# Patient Record
Sex: FEMALE | Race: WHITE | NOT HISPANIC OR LATINO | Employment: OTHER | URBAN - METROPOLITAN AREA
[De-identification: names, ages, dates, MRNs, and addresses within clinical notes are randomized per-mention and may not be internally consistent; named-entity substitution may affect disease eponyms.]

---

## 2023-04-30 ENCOUNTER — HOSPITAL ENCOUNTER (EMERGENCY)
Facility: CLINIC | Age: 73
Discharge: HOME | End: 2023-04-30
Attending: EMERGENCY MEDICINE | Admitting: EMERGENCY MEDICINE
Payer: COMMERCIAL

## 2023-04-30 VITALS
SYSTOLIC BLOOD PRESSURE: 166 MMHG | HEIGHT: 66 IN | OXYGEN SATURATION: 99 % | HEART RATE: 94 BPM | RESPIRATION RATE: 18 BRPM | WEIGHT: 179.45 LBS | BODY MASS INDEX: 28.84 KG/M2 | TEMPERATURE: 98 F | DIASTOLIC BLOOD PRESSURE: 99 MMHG

## 2023-04-30 DIAGNOSIS — G51.0 BELL'S PALSY: ICD-10-CM

## 2023-04-30 DIAGNOSIS — I10 HYPERTENSION, UNSPECIFIED TYPE: ICD-10-CM

## 2023-04-30 DIAGNOSIS — H66.90 ACUTE OTITIS MEDIA, UNSPECIFIED OTITIS MEDIA TYPE: ICD-10-CM

## 2023-04-30 LAB — GLUCOSE BLDC GLUCOMTR-MCNC: 120 MG/DL (ref 70–99)

## 2023-04-30 PROCEDURE — 82962 GLUCOSE BLOOD TEST: CPT

## 2023-04-30 PROCEDURE — 99284 EMERGENCY DEPT VISIT MOD MDM: CPT

## 2023-04-30 RX ORDER — AZITHROMYCIN 250 MG/1
TABLET, FILM COATED ORAL
Qty: 6 TABLET | Refills: 0 | Status: SHIPPED | OUTPATIENT
Start: 2023-04-30 | End: 2023-05-05

## 2023-04-30 RX ORDER — VALACYCLOVIR HYDROCHLORIDE 1 G/1
1000 TABLET, FILM COATED ORAL 2 TIMES DAILY
Qty: 20 TABLET | Refills: 0 | Status: SHIPPED | OUTPATIENT
Start: 2023-04-30 | End: 2023-05-10

## 2023-04-30 RX ORDER — PREDNISONE 20 MG/1
TABLET ORAL
Qty: 10 TABLET | Refills: 0 | Status: SHIPPED | OUTPATIENT
Start: 2023-04-30

## 2023-05-01 NOTE — ED TRIAGE NOTES
Pt presents for evaluation of right sided facial droop and numbness that started mid-afternoon. This am, woke up with right eye watery. Denies vision changes or headache. Denies any other symptoms.

## 2023-05-01 NOTE — ED PROVIDER NOTES
"  History     Chief Complaint:  Facial Droop       The history is provided by the patient.      Maria Elena Nance is a 72 year old female with a history of hypothyroid and breast cancer who presents with a right side facial droop. The patient reports that she was golfing on 04/27 when she noticed eye watering and irritation. She notes that this persisted, and when she woke this morning, she noticed the same thing. However, later in the day, Maria Elena reports that she had difficulty moving her mouth at roughly 1:00 PM. She then tried to drink a pop later in the day and she was drooling. She tried to take a nap, and when she awoke, she noticed that she could not smile, so she did an Internet search and decided she wanted to come into the ED. Additionally, Maria Elena mentions that her tongue feels thin and similar to the sensation of Novocain. She also notes a twinge pain in her right ear, that has since resolved. The patient states that she was not having any of these problems yesterday. She reports drinking 04/27 after golfing. No recent sore throat, fever, cough, runny nose, ear pain, arm symptoms, or leg symptoms. Denies vision changes or headache. Maria Elena states that she does sleep on her right side. The patient is on levothyroxine and is a breast cancer survivor.    Independent Historian:   None - Patient Only        ROS:  Review of Systems  Negative besides those mentioned in the HPI.    Allergies:  Penicillins     Medications:    Levothyroxine     Past Medical History:    Hypothyroid  Breast cancer    Past Surgical History:    Thyroid surgery    Social History:  The patient presents to the ED alone.  The patient presents to the ED via car.  PCP: No primary care provider on file.     Physical Exam     Patient Vitals for the past 24 hrs:   BP Temp Temp src Pulse Resp SpO2 Height Weight   04/30/23 1930 (!) 166/99 -- -- 94 -- 99 % -- --   04/30/23 1915 (!) 182/114 98  F (36.7  C) Oral 95 18 100 % 1.676 m (5' 6\") 81.4 kg " (179 lb 7.3 oz)        Physical Exam  General: The patient is alert, in no respiratory distress.    HENT: Mucous membranes moist. Left TM has erythema and is bulging.    Cardiovascular: Regular rate and rhythm. Good pulses in all four extremities. Normal capillary refill and skin turgor.     Respiratory: Lungs are clear. No nasal flaring. No retractions. No wheezing, no crackles.    Gastrointestinal: Abdomen soft. No guarding, no rebound. No palpable hernias.     Musculoskeletal: No gross deformity.     Skin: No rashes or petechiae.     Neurologic: The patient is alert and oriented x3. GCS 15. No testable cranial nerve deficit. Follows commands with clear and appropriate speech. Gives appropriate answers. Good strength in all extremities. No gross neurologic deficit. Gross sensation intact. Pupils are round and reactive. No meningismus. Dense facial paresis on the left.    Lymphatic: No cervical adenopathy. No lower extremity swelling.    Psychiatric: The patient is non-tearful.    Emergency Department Course     Laboratory:  Labs Ordered and Resulted from Time of ED Arrival to Time of ED Departure   GLUCOSE BY METER - Abnormal       Result Value    GLUCOSE BY METER POCT 120 (*)    GLUCOSE MONITOR NURSING POCT        Emergency Department Course & Assessments:       Assessments:  1918 I obtained history and examined the patient as noted above.    Social Determinants of Health affecting care:   None    Disposition:  The patient was discharged to home.     Impression & Plan      Medical Decision Making:  The patient had fairly abrupt onset of symptoms but her only affected area is the right side of her face and it involves the entire face with significant paresis over the forehead which should be spared if there is a stroke.  She did report some pain in her ear prior to this says she does sleep on that side and had drank some alcohol I think a lot of these factors are caused problems to the facial nerve she also has  signs of an otitis media I therefore will start her on antibiotics valacyclovir and steroids.  We discussed the low likelihood of this being a stroke I think this is just the facial nerve showing paresis I stressed if she would have any new or worsening symptoms she would need to return and we discussed symptomatic treatment and the patient was discharged home in good condition.  Of note we did discuss her blood pressures LV which I think is likely secondary to stress and will need to be rechecked as an outpatient.    Diagnosis:    ICD-10-CM    1. Bell's palsy  G51.0       2. Hypertension, unspecified type  I10       3. Acute otitis media, unspecified otitis media type  H66.90            Discharge Medications:  New Prescriptions    AZITHROMYCIN (ZITHROMAX Z-JAVIER) 250 MG TABLET    Two tablets on the first day, then one tablet daily for the next 4 days    PREDNISONE (DELTASONE) 20 MG TABLET    Take two tablets (= 40mg) each day for 5 (five) days    VALACYCLOVIR (VALTREX) 1000 MG TABLET    Take 1 tablet (1,000 mg) by mouth 2 times daily for 10 days        Scribe Disclosure:  I, Kvng Blount, am serving as a scribe at 7:34 PM on 4/30/2023 to document services personally performed by El Lugo MD, based on my observations and the provider's statements to me.   4/30/2023   El Lugo MD Farnan, Christopher M, MD  04/30/23 2508

## 2023-05-01 NOTE — ED NOTES
Bed: JAVID  Expected date: 4/30/23  Expected time: 6:48 PM  Means of arrival:   Comments:  Lexie CHRISTIE

## 2023-05-01 NOTE — DISCHARGE INSTRUCTIONS
"  Discharge Instructions  Otitis Media  You or your child have an ear infection known as acute otitis media, or middle ear infection (otitis = ear, media = middle). These infections often develop after a virus infection, such as a cold. The cold causes swelling around the pressure-equalizing tube of the ear, which allows fluid to build up in the space behind the eardrum (the middle ear). This fluid build-up can trap bacteria and viruses and increase pressure on the eardrum causing pain.  Return to the Emergency Department if:  You or your child are not better within 24-48 hours.  Your child becomes very fussy or weak.  Your child is showing signs of dehydration, such as less than 3 wet diapers per day.  Your symptoms get worse, or if you develop a severe headache, stiff neck, or new symptoms.  You have signs of allergic reaction to medicine. These include rash, lip swelling, difficulty breathing, wheezing, and dizziness.    Ear infection symptoms:   Symptoms of an ear infection can include ear aching or pain and temporary hearing loss. These symptoms often come on suddenly.    Ear infection symptoms (infants / young children):  Fever (temperature greater than 100.4 F or 38 C).  Pulling on the ear.  Fussiness.  Decreased activity.  Lack of appetite or difficulty eating.  Vomiting or diarrhea.    Treatment:   The \"best\" treatment depends on your age, history of previous infections, and any underlying medical problems.  Antibiotics are not given to every patient with an ear infection because studies show that many people with ear infections will improve without using antibiotics. Because antibiotics can have side effects such as diarrhea and stomach upset and can also cause severe allergic reactions, doctors are trying to avoid using antibiotics if it is safe for the patient to do so.   In these cases, a prescription for antibiotics may be given to be filled in 24 -48 hours if symptoms are getting worse or not " "improving. If the symptoms are improving, the antibiotic does not need to be taken.   Remember, antibiotics do not treat pain.    Pain medications. You may take a pain medication such as Tylenol  (acetaminophen), Advil  (ibuprofen), Nuprin  (ibuprofen) or Aleve  (naproxen).  If you have been given a narcotic such as Vicodin  (hydrocodone with acetaminophen), Percocet  (oxycodone with acetaminophen), or codeine, do not drive for four hours after you have taken it. If the narcotic contains Tylenol  (acetaminophen), do not take Tylenol  with it. All narcotics will cause constipation, so eat a high fiber diet.    Pain treatment options also include ear drops such as Auralgan  (antipyrine/benzocaine/u-polycosanol), which contains a topical numbing medicine.   Do not take a medication if you have a known allergy to that medication.  Probiotics: If you have been given an antibiotic, you may want to also take a probiotic pill or eat yogurt with live cultures. Probiotics have \"good bacteria\" to help your intestines stay healthy. Studies have shown that probiotics help prevent diarrhea and other intestine problems (including C. diff infection) when you take antibiotics. You can buy these without a prescription in the pharmacy section of the store.   Complications:    Tympanic membrane rupture - One possible complication of an ear infection is rupture of the tympanic membrane, or ear drum. This happens because of pressure on the tympanic membrane from the infected fluid. When the tympanic membrane ruptures, you may have pus or blood drain from the ear. It does not hurt when the membrane ruptures, and many people actually feel better because pressure is released. Fortunately, the tympanic membrane usually heals quickly after rupturing, within hours to days. You should keep water out of the ear until you re-check with your doctor to be sure the ear drum has healed.     Mastoiditis - Rarely, the area behind the ear can become " infected, this area is called the mastoid.  If you notice redness and swelling behind your ear, see your physician or return to the Emergency Department immediately.      Hearing loss - The fluid that collects behind the eardrum (called an effusion) can persist for weeks to months after the pain of an ear infection resolves. An effusion causes trouble hearing, which is usually temporary. If the fluid persists, however, it can interfere with the process of learning to speak.   For this reason, children under 2 need to be seen by their pediatrician WITHIN 3 MONTHS to ensure that the fluid has been reabsorbed.  If you were given a prescription for medicine here today, be sure to read all of the information (including the package insert) that comes with your prescription.  This will include important information about the medicine, its side effects, and any warnings that you need to know about.  The pharmacist who fills the prescription can provide more information and answer questions you may have about the medicine.  If you have questions or concerns that the pharmacist cannot address, please call or return to the Emergency Department.   Opioid Medication Information    Pain medications are among the most commonly prescribed medicines, so we are including this information for all our patients. If you did not receive pain medication or get a prescription for pain medicine, you can ignore it.     You may have been given a prescription for an opioid (narcotic) pain medicine and/or have received a pain medicine while here in the Emergency Department. These medicines can make you drowsy or impaired. You must not drive, operate dangerous equipment, or engage in any other dangerous activities while taking these medications. If you drive while taking these medications, you could be arrested for DUI, or driving under the influence. Do not drink any alcohol while you are taking these medications.     Opioid pain medications can  cause addiction. If you have a history of chemical dependency of any type, you are at a higher risk of becoming addicted to pain medications.  Only take these prescribed medications to treat your pain when all other options have been tried. Take it for as short a time and as few doses as possible. Store your pain pills in a secure place, as they are frequently stolen and provide a dangerous opportunity for children or visitors in your house to start abusing these powerful medications. We will not replace any lost or stolen medicine.  As soon as your pain is better, you should flush all your remaining medication.     Many prescription pain medications contain Tylenol  (acetaminophen), including Vicodin , Tylenol #3 , Norco , Lortab , and Percocet .  You should not take any extra pills of Tylenol  if you are using these prescription medications or you can get very sick.  Do not ever take more than 3000 mg of acetaminophen in any 24 hour period.    All opioids tend to cause constipation. Drink plenty of water and eat foods that have a lot of fiber, such as fruits, vegetables, prune juice, apple juice and high fiber cereal.  Take a laxative if you don t move your bowels at least every other day. Miralax , Milk of Magnesia, Colace , or Senna  can be used to keep you regular.      Remember that you can always come back to the Emergency Department if you are not able to see your regular doctor in the amount of time listed above, if you get any new symptoms, or if there is anything that worries you.    Discharge Instructions  Hypertension - High Blood Pressure    During you visit to the Emergency Department, your blood pressure was higher than the recommended blood pressure.  This may be related to stress, pain, medication or other temporary conditions. In these cases, your blood pressure may return to normal on its own. If you have a history of high blood pressure, you may need to have your doctor adjust your medications.  Sometimes, your high measurement here may indicate that you have developed high blood pressure that will stay high unless it is treated. Sudden very high blood pressure can cause problems, but usually high blood pressure causes problems over months to years.      Blood pressure is almost never lowered in the Emergency Department, because studies have shown that lowering blood pressure too quickly is much more dangerous than leaving it alone.    You need to follow up with your doctor in 1-3 days to get your blood pressure rechecked.     Return to the Emergency Department if you start to have:  A severe headache.  Chest pain.  Shortness of breath.  Weakness or numbness that affects one part of the body.  Confusion.  Vision changes.  Significant swelling of legs and/or eyes.  A reaction to any medication started in the Emergency Department.    What can I do to help myself?  Avoid alcohol.  Take any blood pressure medicine that you are prescribed.  Get a good night s sleep.  Lower your salt intake.  Exercise.  Lose weight.  Manage stress.    If blood pressure medication was started in the Emergency Department:  The medicine may not have an immediate effect. The body and brain determine what blood pressure you have. The medicine s job is to retrain the body s  thermostat  to a lower blood pressure.  You will need to follow up with your doctor to see how this medicine is working for you.  If you were given a prescription for medicine here today, be sure to read all of the information (including the package insert) that comes with your prescription.  This will include important information about the medicine, its side effects, and any warnings that you need to know about.  The pharmacist who fills the prescription can provide more information and answer questions you may have about the medicine.  If you have questions or concerns that the pharmacist cannot address, please call or return to the Emergency Department.    Opioid Medication Information    Pain medications are among the most commonly prescribed medicines, so we are including this information for all our patients. If you did not receive pain medication or get a prescription for pain medicine, you can ignore it.     You may have been given a prescription for an opioid (narcotic) pain medicine and/or have received a pain medicine while here in the Emergency Department. These medicines can make you drowsy or impaired. You must not drive, operate dangerous equipment, or engage in any other dangerous activities while taking these medications. If you drive while taking these medications, you could be arrested for DUI, or driving under the influence. Do not drink any alcohol while you are taking these medications.     Opioid pain medications can cause addiction. If you have a history of chemical dependency of any type, you are at a higher risk of becoming addicted to pain medications.  Only take these prescribed medications to treat your pain when all other options have been tried. Take it for as short a time and as few doses as possible. Store your pain pills in a secure place, as they are frequently stolen and provide a dangerous opportunity for children or visitors in your house to start abusing these powerful medications. We will not replace any lost or stolen medicine.  As soon as your pain is better, you should flush all your remaining medication.     Many prescription pain medications contain Tylenol  (acetaminophen), including Vicodin , Tylenol #3 , Norco , Lortab , and Percocet .  You should not take any extra pills of Tylenol  if you are using these prescription medications or you can get very sick.  Do not ever take more than 3000 mg of acetaminophen in any 24 hour period.    All opioids tend to cause constipation. Drink plenty of water and eat foods that have a lot of fiber, such as fruits, vegetables, prune juice, apple juice and high fiber cereal.  Take a  laxative if you don t move your bowels at least every other day. Miralax , Milk of Magnesia, Colace , or Senna  can be used to keep you regular.      Remember that you can always come back to the Emergency Department if you are not able to see your regular doctor in the amount of time listed above, if you get any new symptoms, or if there is anything that worries you.